# Patient Record
Sex: MALE | URBAN - NONMETROPOLITAN AREA
[De-identification: names, ages, dates, MRNs, and addresses within clinical notes are randomized per-mention and may not be internally consistent; named-entity substitution may affect disease eponyms.]

---

## 2024-08-26 ENCOUNTER — OFFICE VISIT (OUTPATIENT)
Dept: URBAN - NONMETROPOLITAN AREA CLINIC 4 | Facility: CLINIC | Age: 39
End: 2024-08-26
Payer: MEDICAID

## 2024-08-26 ENCOUNTER — DASHBOARD ENCOUNTERS (OUTPATIENT)
Age: 39
End: 2024-08-26

## 2024-08-26 VITALS
SYSTOLIC BLOOD PRESSURE: 153 MMHG | TEMPERATURE: 99 F | BODY MASS INDEX: 25.35 KG/M2 | DIASTOLIC BLOOD PRESSURE: 93 MMHG | WEIGHT: 187.2 LBS | HEART RATE: 82 BPM | HEIGHT: 72 IN

## 2024-08-26 DIAGNOSIS — K92.1 BLOOD IN STOOL: ICD-10-CM

## 2024-08-26 DIAGNOSIS — Z86.010 COLON POLYP HISTORY: ICD-10-CM

## 2024-08-26 DIAGNOSIS — R13.19 ESOPHAGEAL DYSPHAGIA: ICD-10-CM

## 2024-08-26 DIAGNOSIS — R19.4 CHANGE IN BOWEL HABIT: ICD-10-CM

## 2024-08-26 PROBLEM — 235595009: Status: ACTIVE | Noted: 2024-08-26

## 2024-08-26 PROCEDURE — 99244 OFF/OP CNSLTJ NEW/EST MOD 40: CPT | Performed by: PHYSICIAN ASSISTANT

## 2024-08-26 PROCEDURE — 99204 OFFICE O/P NEW MOD 45 MIN: CPT | Performed by: PHYSICIAN ASSISTANT

## 2024-08-26 RX ORDER — OMEPRAZOLE 40 MG/1
1 CAPSULE 30 MINUTES BEFORE MORNING MEAL CAPSULE, DELAYED RELEASE ORAL ONCE A DAY
Status: ACTIVE | COMMUNITY

## 2024-08-26 RX ORDER — VONOPRAZAN FUMARATE 13.36 MG/1
1 TABLET TABLET ORAL ONCE A DAY
Qty: 30 | Refills: 3 | OUTPATIENT
Start: 2024-08-26 | End: 2024-12-23

## 2024-08-26 RX ORDER — NAPHAZOLINE HCL 0.012 %
2 TABLETS DROPS OPHTHALMIC (EYE) ONCE A DAY
Status: ACTIVE | COMMUNITY

## 2024-08-26 NOTE — HPI-TODAY'S VISIT:
Adam is 39 years old he has no significant past medical history he presents that the office today secondary to acid reflux, as well as blood in his stool.  He states he underwent a colonoscopy more than 10 years ago for the same reason, he believes that hemorrhoids were the etiology and has not followed up with gastroenterology since. Mostly he is here today secondary to bile reflux which he states he wakes up with several times throughout the evening, which is now affecting his voice.  he does abuse tobacco, drinks socially on the weekends 5 to 10 drinks a weekend, he also does take significant amounts of non steroidal anti inflammatory drugs for headaches, which he states at sometimes can last for several days  No known family history of colon cancer  He has lost about 30 this year, denies any intentional weight loss, but admits to recently going through a divorce

## 2024-09-10 ENCOUNTER — OFFICE VISIT (OUTPATIENT)
Dept: URBAN - METROPOLITAN AREA SURGERY CENTER 14 | Facility: SURGERY CENTER | Age: 39
End: 2024-09-10

## 2024-10-03 ENCOUNTER — OFFICE VISIT (OUTPATIENT)
Dept: URBAN - METROPOLITAN AREA SURGERY CENTER 14 | Facility: SURGERY CENTER | Age: 39
End: 2024-10-03

## 2024-10-07 ENCOUNTER — OFFICE VISIT (OUTPATIENT)
Dept: URBAN - NONMETROPOLITAN AREA CLINIC 4 | Facility: CLINIC | Age: 39
End: 2024-10-07

## 2024-11-05 ENCOUNTER — OFFICE VISIT (OUTPATIENT)
Dept: URBAN - METROPOLITAN AREA SURGERY CENTER 14 | Facility: SURGERY CENTER | Age: 39
End: 2024-11-05

## 2024-11-07 ENCOUNTER — OFFICE VISIT (OUTPATIENT)
Dept: URBAN - METROPOLITAN AREA SURGERY CENTER 14 | Facility: SURGERY CENTER | Age: 39
End: 2024-11-07
Payer: COMMERCIAL

## 2024-11-07 ENCOUNTER — CLAIMS CREATED FROM THE CLAIM WINDOW (OUTPATIENT)
Dept: URBAN - METROPOLITAN AREA CLINIC 4 | Facility: CLINIC | Age: 39
End: 2024-11-07
Payer: COMMERCIAL

## 2024-11-07 ENCOUNTER — TELEPHONE ENCOUNTER (OUTPATIENT)
Dept: URBAN - METROPOLITAN AREA CLINIC 54 | Facility: CLINIC | Age: 39
End: 2024-11-07

## 2024-11-07 DIAGNOSIS — K22.70 BARRETT'S ESOPHAGUS: ICD-10-CM

## 2024-11-07 DIAGNOSIS — D12.3 BENIGN NEOPLASM OF TRANSVERSE COLON: ICD-10-CM

## 2024-11-07 DIAGNOSIS — K31.89 ACHYLIA: ICD-10-CM

## 2024-11-07 DIAGNOSIS — K22.719 BARRETT'S ESOPHAGUS WITH DYSPLASIA, UNSPECIFIED: ICD-10-CM

## 2024-11-07 DIAGNOSIS — K22.89 OTHER SPECIFIED DISEASE OF ESOPHAGUS: ICD-10-CM

## 2024-11-07 DIAGNOSIS — K22.719 BARRETT ESOPHAGUS WITH DYSPLASIA: ICD-10-CM

## 2024-11-07 DIAGNOSIS — Z12.11 COLON CANCER SCREENING: ICD-10-CM

## 2024-11-07 DIAGNOSIS — R12 BURNING REFLUX: ICD-10-CM

## 2024-11-07 DIAGNOSIS — D12.3 ADENOMA OF TRANSVERSE COLON: ICD-10-CM

## 2024-11-07 DIAGNOSIS — K57.30 DIVERTICULOSIS OF SIGMOID COLON: ICD-10-CM

## 2024-11-07 DIAGNOSIS — R13.19 CERVICAL DYSPHAGIA: ICD-10-CM

## 2024-11-07 DIAGNOSIS — K64.8 OTHER HEMORRHOIDS: ICD-10-CM

## 2024-11-07 DIAGNOSIS — K22.89 DILATATION OF ESOPHAGUS: ICD-10-CM

## 2024-11-07 DIAGNOSIS — K63.5 POLYP OF TRANSVERSE COLON, UNSPECIFIED TYPE: ICD-10-CM

## 2024-11-07 DIAGNOSIS — Z12.11 ENCOUNTER SCREENING FOR MALIGNANT NEOPLASM OF COLON: ICD-10-CM

## 2024-11-07 DIAGNOSIS — K31.89 OTHER DISEASES OF STOMACH AND DUODENUM: ICD-10-CM

## 2024-11-07 PROCEDURE — 45385 COLONOSCOPY W/LESION REMOVAL: CPT | Performed by: INTERNAL MEDICINE

## 2024-11-07 PROCEDURE — 88312 SPECIAL STAINS GROUP 1: CPT | Performed by: PATHOLOGY

## 2024-11-07 PROCEDURE — 43239 EGD BIOPSY SINGLE/MULTIPLE: CPT | Performed by: INTERNAL MEDICINE

## 2024-11-07 PROCEDURE — 00813 ANES UPR LWR GI NDSC PX: CPT | Performed by: NURSE ANESTHETIST, CERTIFIED REGISTERED

## 2024-11-07 PROCEDURE — 88305 TISSUE EXAM BY PATHOLOGIST: CPT | Performed by: PATHOLOGY

## 2024-11-07 PROCEDURE — 43248 EGD GUIDE WIRE INSERTION: CPT | Performed by: INTERNAL MEDICINE

## 2024-11-07 RX ORDER — OMEPRAZOLE 40 MG/1
1 CAPSULE 30 MINUTES BEFORE MORNING MEAL CAPSULE, DELAYED RELEASE ORAL ONCE A DAY
Qty: 30 | Refills: 0

## 2024-11-07 RX ORDER — VONOPRAZAN FUMARATE 13.36 MG/1
1 TABLET TABLET ORAL ONCE A DAY
OUTPATIENT
Start: 2024-08-26 | End: 2024-12-23

## 2024-11-07 RX ORDER — VONOPRAZAN FUMARATE 13.36 MG/1
1 TABLET TABLET ORAL ONCE A DAY
Qty: 30 | Refills: 3 | Status: ACTIVE | COMMUNITY
Start: 2024-08-26 | End: 2024-12-23

## 2024-11-07 RX ORDER — OMEPRAZOLE 40 MG/1
1 CAPSULE 30 MINUTES BEFORE MORNING MEAL CAPSULE, DELAYED RELEASE ORAL ONCE A DAY
Status: ACTIVE | COMMUNITY

## 2024-11-07 RX ORDER — NAPHAZOLINE HCL 0.012 %
2 TABLETS DROPS OPHTHALMIC (EYE) ONCE A DAY
Status: ACTIVE | COMMUNITY

## 2024-11-18 ENCOUNTER — OFFICE VISIT (OUTPATIENT)
Dept: URBAN - NONMETROPOLITAN AREA CLINIC 4 | Facility: CLINIC | Age: 39
End: 2024-11-18

## 2024-11-20 ENCOUNTER — OFFICE VISIT (OUTPATIENT)
Dept: URBAN - NONMETROPOLITAN AREA CLINIC 4 | Facility: CLINIC | Age: 39
End: 2024-11-20

## 2024-11-20 VITALS
HEIGHT: 72 IN | SYSTOLIC BLOOD PRESSURE: 152 MMHG | BODY MASS INDEX: 25.87 KG/M2 | HEART RATE: 91 BPM | DIASTOLIC BLOOD PRESSURE: 86 MMHG | WEIGHT: 191 LBS | TEMPERATURE: 96.8 F

## 2024-11-20 PROBLEM — 397881000: Status: ACTIVE | Noted: 2024-11-20

## 2024-11-20 PROBLEM — 302914006: Status: ACTIVE | Noted: 2024-11-20

## 2024-11-20 RX ORDER — NAPHAZOLINE HCL 0.012 %
2 TABLETS DROPS OPHTHALMIC (EYE) ONCE A DAY
Status: ACTIVE | COMMUNITY

## 2024-11-20 RX ORDER — OMEPRAZOLE 40 MG/1
1 CAPSULE 30 MINUTES BEFORE MORNING MEAL CAPSULE, DELAYED RELEASE ORAL ONCE A DAY
Qty: 30 | Refills: 0 | Status: ACTIVE | COMMUNITY

## 2024-11-20 NOTE — HPI-TODAY'S VISIT:
Adam is 39 years old he has no significant past medical history he presents that the office today secondary to acid reflux, as well as blood in his stool.  He states he underwent a colonoscopy more than 10 years ago for the same reason, he believes that hemorrhoids were the etiology and has not followed up with gastroenterology since. Mostly he is here today secondary to bile reflux which he states he wakes up with several times throughout the evening, which is now affecting his voice.  he does abuse tobacco, drinks socially on the weekends 5 to 10 drinks a weekend, he also does take significant amounts of non steroidal anti inflammatory drugs for headaches, which he states at sometimes can last for several days  No known family history of colon cancer  He has lost about 30 this year, denies any intentional weight loss, but admits to recently going through a divorce  11/20/24: Pt RTC for f/u. Had EGD/colon 11/7/24. EGD findings: No endoscopic esophageal abnormality to explain patient's dysphagia. Esophagus dilated with 51 F Sav[place]. No resistance or heme noted. Biopsies were taken with a cold forceps for evaluation of eosinophilic esophagitis. Esophageal mucosal changes classified as [place]'s stage C0-M1 per [place] criteria. Biopsied. Normal stomach. Biopsied. Normal examined duodenum. Biopsied Bx c/w Barretts   Cscope findings: Two 3 to 7 mm polyps in the transverse colon, removed with a cold snare. Resected and retrieved. Diverticulosis in the sigmoid colon. External and internal hemorrhoids. Bx c/w TA, rpt 7 yrs  He is taking omeprazole 40 mg daily. He continues to smoke and drink alcohol.

## 2025-02-26 ENCOUNTER — OFFICE VISIT (OUTPATIENT)
Dept: URBAN - NONMETROPOLITAN AREA CLINIC 4 | Facility: CLINIC | Age: 40
End: 2025-02-26

## 2025-04-03 ENCOUNTER — OFFICE VISIT (OUTPATIENT)
Dept: URBAN - NONMETROPOLITAN AREA CLINIC 4 | Facility: CLINIC | Age: 40
End: 2025-04-03